# Patient Record
Sex: FEMALE | Race: BLACK OR AFRICAN AMERICAN | NOT HISPANIC OR LATINO | Employment: FULL TIME | URBAN - METROPOLITAN AREA
[De-identification: names, ages, dates, MRNs, and addresses within clinical notes are randomized per-mention and may not be internally consistent; named-entity substitution may affect disease eponyms.]

---

## 2017-06-14 ENCOUNTER — HOSPITAL ENCOUNTER (OUTPATIENT)
Dept: RADIOLOGY | Facility: HOSPITAL | Age: 37
Discharge: HOME | End: 2017-06-14
Attending: GENERAL PRACTICE
Payer: MEDICAID

## 2017-06-14 DIAGNOSIS — R76.12 POSITIVE QUANTIFERON-TB GOLD TEST: ICD-10-CM

## 2017-06-14 DIAGNOSIS — R76.12 POSITIVE QUANTIFERON-TB GOLD TEST: Primary | ICD-10-CM

## 2017-06-14 PROCEDURE — 71020 XR CHEST PA AND LATERAL: CPT | Mod: TC

## 2017-06-14 PROCEDURE — 71020 XR CHEST PA AND LATERAL: CPT | Mod: 26,,, | Performed by: RADIOLOGY

## 2017-11-02 ENCOUNTER — HOSPITAL ENCOUNTER (EMERGENCY)
Facility: HOSPITAL | Age: 37
Discharge: HOME | End: 2017-11-02
Attending: EMERGENCY MEDICINE
Payer: MEDICAID

## 2017-11-02 VITALS
TEMPERATURE: 98 F | RESPIRATION RATE: 18 BRPM | WEIGHT: 128 LBS | DIASTOLIC BLOOD PRESSURE: 82 MMHG | SYSTOLIC BLOOD PRESSURE: 161 MMHG | OXYGEN SATURATION: 99 % | HEART RATE: 82 BPM | HEIGHT: 67 IN | BODY MASS INDEX: 20.09 KG/M2

## 2017-11-02 DIAGNOSIS — K04.01 ACUTE PULPITIS: Primary | ICD-10-CM

## 2017-11-02 LAB
B-HCG UR QL: NEGATIVE
CTP QC/QA: YES

## 2017-11-02 PROCEDURE — 64400 NJX AA&/STRD TRIGEMINAL NRV: CPT

## 2017-11-02 PROCEDURE — 63600175 PHARM REV CODE 636 W HCPCS: Performed by: PHYSICIAN ASSISTANT

## 2017-11-02 PROCEDURE — 99284 EMERGENCY DEPT VISIT MOD MDM: CPT | Mod: 25

## 2017-11-02 PROCEDURE — 81025 URINE PREGNANCY TEST: CPT | Performed by: PHYSICIAN ASSISTANT

## 2017-11-02 PROCEDURE — 96375 TX/PRO/DX INJ NEW DRUG ADDON: CPT

## 2017-11-02 PROCEDURE — 25000003 PHARM REV CODE 250: Performed by: PHYSICIAN ASSISTANT

## 2017-11-02 PROCEDURE — 64402 HC NERVE BLOCK INJ - FACIAL: CPT

## 2017-11-02 PROCEDURE — 96374 THER/PROPH/DIAG INJ IV PUSH: CPT

## 2017-11-02 RX ORDER — ONDANSETRON 2 MG/ML
4 INJECTION INTRAMUSCULAR; INTRAVENOUS
Status: COMPLETED | OUTPATIENT
Start: 2017-11-02 | End: 2017-11-02

## 2017-11-02 RX ORDER — CLINDAMYCIN HYDROCHLORIDE 300 MG/1
300 CAPSULE ORAL 3 TIMES DAILY
Qty: 21 CAPSULE | Refills: 0 | Status: SHIPPED | OUTPATIENT
Start: 2017-11-02 | End: 2017-11-09

## 2017-11-02 RX ORDER — IBUPROFEN 600 MG/1
600 TABLET ORAL EVERY 6 HOURS PRN
Qty: 20 TABLET | Refills: 0 | Status: SHIPPED | OUTPATIENT
Start: 2017-11-02

## 2017-11-02 RX ORDER — KETOROLAC TROMETHAMINE 30 MG/ML
15 INJECTION, SOLUTION INTRAMUSCULAR; INTRAVENOUS
Status: COMPLETED | OUTPATIENT
Start: 2017-11-02 | End: 2017-11-02

## 2017-11-02 RX ORDER — LIDOCAINE HYDROCHLORIDE 10 MG/ML
10 INJECTION INFILTRATION; PERINEURAL
Status: COMPLETED | OUTPATIENT
Start: 2017-11-02 | End: 2017-11-02

## 2017-11-02 RX ORDER — TRAMADOL HYDROCHLORIDE 50 MG/1
50 TABLET ORAL EVERY 6 HOURS PRN
Qty: 12 TABLET | Refills: 0 | Status: SHIPPED | OUTPATIENT
Start: 2017-11-02 | End: 2017-11-12

## 2017-11-02 RX ADMIN — LIDOCAINE HYDROCHLORIDE 10 ML: 10 INJECTION, SOLUTION INFILTRATION; PERINEURAL at 01:11

## 2017-11-02 RX ADMIN — KETOROLAC TROMETHAMINE 15 MG: 30 INJECTION, SOLUTION INTRAMUSCULAR at 01:11

## 2017-11-02 RX ADMIN — ONDANSETRON 4 MG: 2 INJECTION INTRAMUSCULAR; INTRAVENOUS at 01:11

## 2017-11-02 NOTE — ED NOTES
Patient has verified the spelling of their name and  on armband  LOC: The patient is awake, alert, and aware of environment with an appropriate affect, the patient is oriented x 4 and speaking appropriately.   APPEARANCE: Patient resting comfortably and in no acute distress, patient is clean and well groomed, patient's clothing is properly fastened.   SKIN: The skin is warm and dry, color consistent with ethnicity, patient has normal skin turgor and moist mucus membranes, skin intact, facial swelling noted.   : Voids without difficulty  MUSCULOSKELETAL: Patient moving all extremities spontaneously, no obvious swelling or deformities noted.   RESPIRATORY: Airway is open and patent, respirations are spontaneous, patient has a normal effort and rate, no accessory muscle use noted, bilateral breath sounds clear, denies SOB   ABDOMEN: Soft and non tender to palpation, no distention noted, normoactive bowel sounds present in all four quadrants, pt with emesis x 1.   CARDIAC: Normal rate and rhythm, no peripheral edema noted, less then 3 second capillary refill, denies chest pain  COMPLAINT: facial swelling and tooth pain x 1 week

## 2017-11-05 NOTE — ED PROVIDER NOTES
Encounter Date: 11/2/2017       History     Chief Complaint   Patient presents with    Dental Pain     left lower dental pain since this am     Lorraine Quintanilla 37 y.o.afebrile female presented to the ED with c/o left lower dental pain for the past several days.  She states that she has known dental cavities of the area and that the pain has gradually worsened over the past 1 week and moderately worsened this morning. She describes the pain as a constant throbbing sensation that is exacerbated with extreme temperatures.  She denies any fever, chills, headache, neck pain taste disturbance, sore throat, difficulty swallowing, or facial swelling. She states that she is taking over-the-counter medication and Orajel with little relief of symptoms. He does report some nausea and 2 episodes of vomiting today but she states occurs when she is in marked pain.       The history is provided by the patient.     Review of patient's allergies indicates:   Allergen Reactions    Pcn [penicillins] Nausea And Vomiting     History reviewed. No pertinent past medical history.  History reviewed. No pertinent surgical history.  History reviewed. No pertinent family history.  Social History   Substance Use Topics    Smoking status: Current Every Day Smoker    Smokeless tobacco: Never Used    Alcohol use No     Review of Systems   Constitutional: Negative for chills and fever.   HENT: Positive for dental problem. Negative for drooling, ear pain, facial swelling, postnasal drip and sore throat.    Gastrointestinal: Negative for nausea and vomiting.   Musculoskeletal: Negative for neck pain and neck stiffness.   Skin: Negative for color change and rash.   Neurological: Negative for weakness and headaches.   Hematological: Does not bruise/bleed easily.       Physical Exam     Initial Vitals [11/02/17 1305]   BP Pulse Resp Temp SpO2   (!) 198/90 90 16 98.3 °F (36.8 °C) 100 %      MAP       126         Physical Exam    Nursing note and vitals  reviewed.  Constitutional: Vital signs are normal. She appears well-developed and well-nourished. She is cooperative.  Non-toxic appearance. She does not appear ill. She appears distressed.   HENT:   Head: Normocephalic and atraumatic.   Mouth/Throat: Oropharynx is clear and moist and mucous membranes are normal. Dental caries present. No dental abscesses.       Eyes: Conjunctivae and lids are normal.   Neck: Neck supple. No neck rigidity.   Cardiovascular: Normal rate.   Pulmonary/Chest: Breath sounds normal. No respiratory distress. She has no rhonchi.   Abdominal: Soft. Normal appearance. There is no tenderness. There is no rigidity.   Musculoskeletal: Normal range of motion.   Neurological: She is alert and oriented to person, place, and time. GCS eye subscore is 4. GCS verbal subscore is 5. GCS motor subscore is 6.   Skin: Skin is warm, dry and intact. No rash noted. No erythema.   Psychiatric: She has a normal mood and affect. Her speech is normal and behavior is normal. Thought content normal.         ED Course   Nerve Block  Date/Time: 11/2/2017 3:06 PM  Location procedure was performed: [facility] EMERGENCY DEPARTMENT  Performed by: ZAC GARCÍA  Authorized by: PENNY BARLOW   Assisting provider: PENNY BARLOW  Pre-operative diagnosis: puplitis  Post-operative diagnosis: pulpitis  Consent Done: Yes  Consent: Verbal consent obtained.  Risks and benefits: risks, benefits and alternatives were discussed  Consent given by: patient  Patient understanding: patient states understanding of the procedure being performed  Indications: pain relief  Body area: head  Nerve: mental  Laterality: left  Patient sedated: no  Patient position: supine  Needle size: 27 G  Location technique: anatomical landmarks  Local Anesthetic: lidocaine 1% without epinephrine  Anesthetic total: 4 mL  Complications: No  Specimens: No  Implants: No  Outcome: pain improved  Patient tolerance: Patient tolerated the  procedure well with no immediate complications        Lorraine Quintanilla 37 y.o.afebrile female presented to the ED with c/o left lower dental pain for the past several days.  She states that she has known dental cavities of the area and that the pain has gradually worsened over the past 1 week and moderately worsened this morning. She describes the pain as a constant throbbing sensation that is exacerbated with extreme temperatures.  She denies any fever, chills, headache, neck pain taste disturbance, sore throat, difficulty swallowing, or facial swelling. She states that she is taking over-the-counter medication and Orajel with little relief of symptoms. He does report some nausea and 2 episodes of vomiting today but she states occurs when she is in marked pain. ROS positive for dental pain.  Physical exam reveals patient in some distress due to pain but otherwise well appearing. Face with no swelling or trismus. TTP of the circled Tooth with moderate dental decay noted with no fluctuance, drainage, bleeding, tonsillar, peritonsillar swelling, erythema, sublingual swelling or lymphadenopathy at this time. FROM of the TMJ. TM's and ear canal free of abnormality. FROM of neck and fair ROM of extremities. Lungs clear, heart regular rate and rhythm.    DDX: pain due to dental caries; pulpitis, dental abscess    ED management: patient remains well appearing, afebrile and with no signs of abscess at this time however, as she intends to see dentist we will place on antibiotics for pulpitis. I did perform a dental block on this patient with moderate reduction in pain; See procedure note. Instructed to take motrin and pain medication as needed.    Impression/Plan: The encounter diagnosis was Acute pulpitis. Discharged with cleocin, ultram and motrin. Patient will follow up with Dentist.  Patient cautioned on when to return to ED.  Pt. Understands and agrees with current treatment plan'                             Attending  Attestation:     Physician Attestation Statement for NP/PA:   I discussed this assessment and plan of this patient with the NP/PA, but I did not personally examine the patient. The face to face encounter was performed by the NP/PA.                  ED Course      Clinical Impression:   The encounter diagnosis was Acute pulpitis.                           GEOFF Vernon  11/05/17 1308       eBnjie Campbell MD  11/07/17 6261

## 2018-11-17 ENCOUNTER — HOSPITAL ENCOUNTER (EMERGENCY)
Facility: HOSPITAL | Age: 38
Discharge: HOME | End: 2018-11-17
Attending: EMERGENCY MEDICINE
Payer: MEDICAID

## 2018-11-17 VITALS
BODY MASS INDEX: 21.14 KG/M2 | SYSTOLIC BLOOD PRESSURE: 151 MMHG | DIASTOLIC BLOOD PRESSURE: 90 MMHG | WEIGHT: 135 LBS | TEMPERATURE: 98 F | RESPIRATION RATE: 16 BRPM | OXYGEN SATURATION: 99 % | HEART RATE: 97 BPM

## 2018-11-17 DIAGNOSIS — S71.152A: Primary | ICD-10-CM

## 2018-11-17 DIAGNOSIS — Z23 NEED FOR TETANUS, DIPHTHERIA, AND ACELLULAR PERTUSSIS (TDAP) VACCINE: ICD-10-CM

## 2018-11-17 LAB
B-HCG UR QL: NEGATIVE
CTP QC/QA: YES

## 2018-11-17 PROCEDURE — 99283 EMERGENCY DEPT VISIT LOW MDM: CPT | Mod: 25

## 2018-11-17 PROCEDURE — 90471 IMMUNIZATION ADMIN: CPT | Performed by: NURSE PRACTITIONER

## 2018-11-17 PROCEDURE — 90715 TDAP VACCINE 7 YRS/> IM: CPT | Performed by: NURSE PRACTITIONER

## 2018-11-17 PROCEDURE — 12001 RPR S/N/AX/GEN/TRNK 2.5CM/<: CPT

## 2018-11-17 PROCEDURE — 63600175 PHARM REV CODE 636 W HCPCS: Performed by: NURSE PRACTITIONER

## 2018-11-17 PROCEDURE — 81025 URINE PREGNANCY TEST: CPT | Performed by: EMERGENCY MEDICINE

## 2018-11-17 PROCEDURE — 25000003 PHARM REV CODE 250: Performed by: NURSE PRACTITIONER

## 2018-11-17 RX ORDER — MUPIROCIN 20 MG/G
OINTMENT TOPICAL
Status: COMPLETED | OUTPATIENT
Start: 2018-11-17 | End: 2018-11-17

## 2018-11-17 RX ORDER — ACETAMINOPHEN AND CODEINE PHOSPHATE 300; 30 MG/1; MG/1
1 TABLET ORAL EVERY 8 HOURS PRN
Qty: 9 TABLET | Refills: 0 | OUTPATIENT
Start: 2018-11-17 | End: 2021-05-27

## 2018-11-17 RX ADMIN — CLOSTRIDIUM TETANI TOXOID ANTIGEN (FORMALDEHYDE INACTIVATED), CORYNEBACTERIUM DIPHTHERIAE TOXOID ANTIGEN (FORMALDEHYDE INACTIVATED), BORDETELLA PERTUSSIS TOXOID ANTIGEN (GLUTARALDEHYDE INACTIVATED), BORDETELLA PERTUSSIS FILAMENTOUS HEMAGGLUTININ ANTIGEN (FORMALDEHYDE INACTIVATED), BORDETELLA PERTUSSIS PERTACTIN ANTIGEN, AND BORDETELLA PERTUSSIS FIMBRIAE 2/3 ANTIGEN 0.5 ML: 5; 2; 2.5; 5; 3; 5 INJECTION, SUSPENSION INTRAMUSCULAR at 12:11

## 2018-11-17 RX ADMIN — MUPIROCIN: 20 OINTMENT TOPICAL at 12:11

## 2018-11-17 NOTE — ED PROVIDER NOTES
"Encounter Date: 11/17/2018       History     Chief Complaint   Patient presents with    Animal Bite     Pt states," I was bitten by a dog on my left thigh today."     The history is provided by the patient. No  was used.   Animal Bite    The incident occurred just prior to arrival. The incident occurred at home. She came to the ER via by private vehicle. There is an injury to the left thigh. The pain is at a severity of 5/10. Pertinent negatives include no chest pain, no abdominal pain, no nausea, no vomiting, no hearing loss, no neck pain and no light-headedness. There have been no prior injuries to these areas. Her tetanus status is unknown. She has received no recent medical care.     Review of patient's allergies indicates:   Allergen Reactions    Pcn [penicillins] Nausea And Vomiting     History reviewed. No pertinent past medical history.  History reviewed. No pertinent surgical history.  History reviewed. No pertinent family history.  Social History     Tobacco Use    Smoking status: Current Every Day Smoker    Smokeless tobacco: Never Used   Substance Use Topics    Alcohol use: No    Drug use: Not on file     Review of Systems   Constitutional: Negative.  Negative for appetite change and fever.   HENT: Negative.  Negative for congestion, dental problem, ear discharge, hearing loss, mouth sores, rhinorrhea and trouble swallowing.    Eyes: Negative.  Negative for pain and discharge.   Respiratory: Negative.  Negative for shortness of breath.    Cardiovascular: Negative.  Negative for chest pain.   Gastrointestinal: Negative.  Negative for abdominal distention, abdominal pain, constipation, diarrhea, nausea, rectal pain and vomiting.   Endocrine: Negative.    Genitourinary: Negative.  Negative for dyspareunia, dysuria, hematuria, vaginal bleeding, vaginal discharge and vaginal pain.   Musculoskeletal: Negative for back pain and neck pain.   Skin: Positive for wound. Negative for rash. "   Allergic/Immunologic: Negative.    Neurological: Negative.  Negative for facial asymmetry, speech difficulty and light-headedness.   Hematological: Negative.    Psychiatric/Behavioral: Negative.  Negative for agitation, dysphoric mood and sleep disturbance.   All other systems reviewed and are negative.      Physical Exam     Initial Vitals [11/17/18 1012]   BP Pulse Resp Temp SpO2   (!) 151/90 97 16 98 °F (36.7 °C) 99 %      MAP       --         Physical Exam    Nursing note and vitals reviewed.  Constitutional: She appears well-developed and well-nourished. She is not diaphoretic.  Non-toxic appearance. She does not appear ill. No distress.   HENT:   Head: Normocephalic and atraumatic.   Eyes: Conjunctivae are normal. Right eye exhibits no discharge. Left eye exhibits no discharge.   Neck: Normal range of motion.   Cardiovascular: Normal rate, regular rhythm, normal heart sounds and intact distal pulses. Exam reveals no gallop and no friction rub.    No murmur heard.  Pulmonary/Chest: Breath sounds normal. No respiratory distress. She has no wheezes. She has no rhonchi. She has no rales. She exhibits no tenderness.   Musculoskeletal: Normal range of motion.   Neurological: She is alert and oriented to person, place, and time.   Skin: Skin is warm and dry. Capillary refill takes less than 2 seconds. No rash noted.        Psychiatric: She has a normal mood and affect. Her behavior is normal. Judgment and thought content normal.         ED Course   Lac Repair  Date/Time: 11/17/2018 1:04 PM  Performed by: Toussaint Battley III, FNP  Authorized by: Kae Cullen DO   Consent Done: Emergent Situation  Body area: lower extremity (Left thigh)  Foreign bodies: no foreign bodies  Tendon involvement: none  Nerve involvement: none  Vascular damage: no  Anesthesia: local infiltration    Anesthesia:  Local Anesthetic: lidocaine 1% with epinephrine  Irrigation solution: saline  Irrigation method: syringe  Amount of cleaning:  standard  Debridement: none  Degree of undermining: none  Skin closure: 3-0 nylon  Number of sutures: 3  Technique: complex  Approximation: loose  Approximation difficulty: simple  Dressing: antibiotic ointment and non-stick sterile dressing  Patient tolerance: Patient tolerated the procedure well with no immediate complications        Labs Reviewed   POCT URINE PREGNANCY          Imaging Results    None          Medical Decision Making:   Initial Assessment:   Animal bite left thigh  Differential Diagnosis:   Abrasion, avulsion  ED Management:  The patient will be discharged home on Tylenol No.  3 and mupirocin with instructions to remove dressing and 24-48 hours and keep clean with mild soap and water and dry, follow up with PCP in 10 days for suture removal and return to the ER as needed if symptoms worsen or fail to improve.  No oral antibiotics were prescribed at this time per [place] antimicrobial guidelines recommendations given the fact that the patient is not immunocompromised and the animal bite was not egregious in presentation.  The patient verbalized understanding of discharge instructions and treatment plan.                      Clinical Impression:   The primary encounter diagnosis was Animal bite of left thigh, initial encounter. A diagnosis of Need for tetanus, diphtheria, and acellular pertussis (Tdap) vaccine was also pertinent to this visit.                             Toussaint Battley III, [facility]  11/17/18 2202

## 2018-11-17 NOTE — ED NOTES
Patient stated about an hour ago a puppy bit her left thigh   Patient with avulsion to left anterior thigh 1 inch by 1 inch  No bleeding  Owner of dog here   Dog has shots

## 2018-11-28 ENCOUNTER — HOSPITAL ENCOUNTER (EMERGENCY)
Facility: HOSPITAL | Age: 38
Discharge: HOME | End: 2018-11-28
Attending: EMERGENCY MEDICINE
Payer: MEDICAID

## 2018-11-28 VITALS
BODY MASS INDEX: 21.35 KG/M2 | WEIGHT: 136 LBS | RESPIRATION RATE: 15 BRPM | HEIGHT: 67 IN | HEART RATE: 102 BPM | SYSTOLIC BLOOD PRESSURE: 136 MMHG | DIASTOLIC BLOOD PRESSURE: 78 MMHG | TEMPERATURE: 98 F | OXYGEN SATURATION: 100 %

## 2018-11-28 DIAGNOSIS — Z48.02 VISIT FOR SUTURE REMOVAL: Primary | ICD-10-CM

## 2018-11-28 PROCEDURE — 99281 EMR DPT VST MAYX REQ PHY/QHP: CPT

## 2018-11-28 NOTE — ED NOTES
Physician at bedside.  Dr. Teresa  romoved the sutures in the lt. Upper thigh area.  Pt.  Ambulating out self care.

## 2018-11-28 NOTE — ED PROVIDER NOTES
Encounter Date: 11/28/2018       History     Chief Complaint   Patient presents with    Suture / Staple Removal      sutures in lt. inner thigh. to be removed.   placed last saturday       Chief complaint:  Suture removal  38-year-old is requesting sutures removed from her left thigh.  Patient was bit by a dog 11 days ago.  She has no complaints.      The history is provided by the patient and medical records.     Review of patient's allergies indicates:   Allergen Reactions    Pcn [penicillins] Nausea And Vomiting     History reviewed. No pertinent past medical history.  History reviewed. No pertinent surgical history.  History reviewed. No pertinent family history.  Social History     Tobacco Use    Smoking status: Current Every Day Smoker    Smokeless tobacco: Never Used   Substance Use Topics    Alcohol use: No    Drug use: Not on file     Review of Systems   Constitutional: Negative for fever.   Skin: Negative for color change.       Physical Exam     Initial Vitals [11/28/18 0846]   BP Pulse Resp Temp SpO2   136/78 102 15 98.3 °F (36.8 °C) 100 %      MAP       --         Physical Exam    Nursing note and vitals reviewed.  Constitutional: No distress.   Pulmonary/Chest: No respiratory distress.   Musculoskeletal:        Legs:  Neurological: She is alert and oriented to person, place, and time.   Psychiatric: Her mood appears anxious.         ED Course   Suture Removal  Date/Time: 11/28/2018 9:03 AM  Performed by: Asia Teresa MD  Authorized by: Asia Teresa MD   Body area: lower extremity  Location details: left upper leg  Wound Appearance: clean, well healed, nontender and no drainage  Sutures Removed: 3  Post-removal: bandaid applied  Facility: sutures placed in this facility        Labs Reviewed - No data to display       Imaging Results    None          Medical Decision Making:   Initial Assessment:   38-year-old presents for suture removal from her left leg.  No evidence of infection.  ED  Management:  Sutures were removed                      Clinical Impression:   The encounter diagnosis was Visit for suture removal.    .                         Asia Teresa MD  11/28/18 0944

## 2019-01-03 NOTE — ED NOTES
Left lower tooth pain since this am, no swelling noted.   Right flank pain suddenly during visit at cancer center. Patient was receiving IV hydration when pain occurred. Patient is stable, alert and oriented in triage.

## 2021-05-10 ENCOUNTER — OCCUPATIONAL HEALTH (OUTPATIENT)
Dept: URGENT CARE | Facility: CLINIC | Age: 41
End: 2021-05-10

## 2021-05-10 DIAGNOSIS — Z02.83 ENCOUNTER FOR DRUG SCREENING: Primary | ICD-10-CM

## 2021-05-10 LAB
CTP QC/QA: YES
POC 5 PANEL DRUG SCREEN: NEGATIVE

## 2021-05-10 PROCEDURE — 80305 DRUG TEST PRSMV DIR OPT OBS: CPT | Mod: S$GLB,,, | Performed by: PHYSICIAN ASSISTANT

## 2021-05-10 PROCEDURE — 80305 POCT RAPID DRUG SCREEN 5 PANEL: ICD-10-PCS | Mod: S$GLB,,, | Performed by: PHYSICIAN ASSISTANT

## 2021-05-26 ENCOUNTER — HOSPITAL ENCOUNTER (EMERGENCY)
Facility: HOSPITAL | Age: 41
Discharge: HOME | End: 2021-05-26
Attending: EMERGENCY MEDICINE
Payer: MEDICAID

## 2021-05-26 VITALS
BODY MASS INDEX: 24.8 KG/M2 | TEMPERATURE: 98 F | DIASTOLIC BLOOD PRESSURE: 75 MMHG | SYSTOLIC BLOOD PRESSURE: 143 MMHG | RESPIRATION RATE: 14 BRPM | HEIGHT: 67 IN | HEART RATE: 80 BPM | WEIGHT: 158 LBS | OXYGEN SATURATION: 99 %

## 2021-05-26 DIAGNOSIS — K08.89 TOOTHACHE: Primary | ICD-10-CM

## 2021-05-26 DIAGNOSIS — K02.9 DENTAL CARIES: ICD-10-CM

## 2021-05-26 LAB
B-HCG UR QL: NEGATIVE
CTP QC/QA: YES

## 2021-05-26 PROCEDURE — 99284 EMERGENCY DEPT VISIT MOD MDM: CPT | Mod: 25

## 2021-05-26 PROCEDURE — 64400 NJX AA&/STRD TRIGEMINAL NRV: CPT | Mod: RT

## 2021-05-26 PROCEDURE — 81025 URINE PREGNANCY TEST: CPT | Performed by: PHYSICIAN ASSISTANT

## 2021-05-26 PROCEDURE — 25000003 PHARM REV CODE 250: Performed by: PHYSICIAN ASSISTANT

## 2021-05-26 RX ORDER — IBUPROFEN 600 MG/1
600 TABLET ORAL
Status: DISCONTINUED | OUTPATIENT
Start: 2021-05-26 | End: 2021-05-26

## 2021-05-26 RX ORDER — KETOROLAC TROMETHAMINE 10 MG/1
10 TABLET, FILM COATED ORAL EVERY 6 HOURS
Qty: 10 TABLET | Refills: 0 | Status: SHIPPED | OUTPATIENT
Start: 2021-05-26

## 2021-05-26 RX ORDER — TRAMADOL HYDROCHLORIDE 50 MG/1
50 TABLET ORAL
Status: COMPLETED | OUTPATIENT
Start: 2021-05-26 | End: 2021-05-26

## 2021-05-26 RX ORDER — CLINDAMYCIN HYDROCHLORIDE 150 MG/1
300 CAPSULE ORAL 4 TIMES DAILY
Qty: 56 CAPSULE | Refills: 0 | Status: SHIPPED | OUTPATIENT
Start: 2021-05-26 | End: 2021-05-27 | Stop reason: SDUPTHER

## 2021-05-26 RX ADMIN — TRAMADOL HYDROCHLORIDE 50 MG: 50 TABLET ORAL at 09:05

## 2021-05-27 ENCOUNTER — HOSPITAL ENCOUNTER (EMERGENCY)
Facility: HOSPITAL | Age: 41
Discharge: HOME | End: 2021-05-27
Attending: EMERGENCY MEDICINE
Payer: MEDICAID

## 2021-05-27 VITALS
TEMPERATURE: 98 F | HEART RATE: 75 BPM | DIASTOLIC BLOOD PRESSURE: 80 MMHG | BODY MASS INDEX: 24.75 KG/M2 | SYSTOLIC BLOOD PRESSURE: 156 MMHG | OXYGEN SATURATION: 98 % | WEIGHT: 158 LBS | RESPIRATION RATE: 17 BRPM

## 2021-05-27 DIAGNOSIS — K08.89 TOOTHACHE: ICD-10-CM

## 2021-05-27 DIAGNOSIS — K08.89 DENTALGIA: ICD-10-CM

## 2021-05-27 DIAGNOSIS — K08.89 PAIN, DENTAL: Primary | ICD-10-CM

## 2021-05-27 PROCEDURE — 63600175 PHARM REV CODE 636 W HCPCS: Performed by: EMERGENCY MEDICINE

## 2021-05-27 PROCEDURE — 99284 EMERGENCY DEPT VISIT MOD MDM: CPT | Mod: 25

## 2021-05-27 PROCEDURE — 64400 NJX AA&/STRD TRIGEMINAL NRV: CPT | Mod: RT

## 2021-05-27 PROCEDURE — 25000003 PHARM REV CODE 250: Performed by: EMERGENCY MEDICINE

## 2021-05-27 PROCEDURE — 96372 THER/PROPH/DIAG INJ SC/IM: CPT | Mod: 59

## 2021-05-27 RX ORDER — ONDANSETRON 4 MG/1
4 TABLET, ORALLY DISINTEGRATING ORAL EVERY 6 HOURS PRN
Qty: 20 TABLET | Refills: 0 | Status: SHIPPED | OUTPATIENT
Start: 2021-05-27

## 2021-05-27 RX ORDER — CHLORHEXIDINE GLUCONATE ORAL RINSE 1.2 MG/ML
15 SOLUTION DENTAL 2 TIMES DAILY
Qty: 473 ML | Refills: 0 | Status: SHIPPED | OUTPATIENT
Start: 2021-05-27 | End: 2021-06-10

## 2021-05-27 RX ORDER — CLINDAMYCIN HYDROCHLORIDE 150 MG/1
300 CAPSULE ORAL 4 TIMES DAILY
Qty: 56 CAPSULE | Refills: 0 | Status: SHIPPED | OUTPATIENT
Start: 2021-05-27 | End: 2021-06-03

## 2021-05-27 RX ORDER — CHLORHEXIDINE GLUCONATE ORAL RINSE 1.2 MG/ML
15 SOLUTION DENTAL ONCE
Status: COMPLETED | OUTPATIENT
Start: 2021-05-27 | End: 2021-05-27

## 2021-05-27 RX ORDER — ONDANSETRON 2 MG/ML
4 INJECTION INTRAMUSCULAR; INTRAVENOUS
Status: COMPLETED | OUTPATIENT
Start: 2021-05-27 | End: 2021-05-27

## 2021-05-27 RX ORDER — OXYCODONE AND ACETAMINOPHEN 2.5; 325 MG/1; MG/1
1 TABLET ORAL EVERY 8 HOURS PRN
Qty: 6 TABLET | Refills: 0 | Status: SHIPPED | OUTPATIENT
Start: 2021-05-27 | End: 2021-05-30

## 2021-05-27 RX ORDER — MORPHINE SULFATE 4 MG/ML
4 INJECTION, SOLUTION INTRAMUSCULAR; INTRAVENOUS
Status: COMPLETED | OUTPATIENT
Start: 2021-05-27 | End: 2021-05-27

## 2021-05-27 RX ADMIN — ONDANSETRON 4 MG: 2 INJECTION INTRAMUSCULAR; INTRAVENOUS at 01:05

## 2021-05-27 RX ADMIN — MORPHINE SULFATE 4 MG: 4 INJECTION INTRAVENOUS at 01:05

## 2021-05-27 RX ADMIN — CHLORHEXIDINE GLUCONATE 15 ML: 1.2 SOLUTION ORAL at 02:05
